# Patient Record
Sex: FEMALE | HISPANIC OR LATINO | URBAN - METROPOLITAN AREA
[De-identification: names, ages, dates, MRNs, and addresses within clinical notes are randomized per-mention and may not be internally consistent; named-entity substitution may affect disease eponyms.]

---

## 2018-10-03 ENCOUNTER — OFFICE VISIT (OUTPATIENT)
Dept: URBAN - METROPOLITAN AREA CLINIC 48 | Facility: CLINIC | Age: 83
End: 2018-10-03
Payer: MEDICARE

## 2018-10-03 DIAGNOSIS — E11.3211 TYPE 2 DIAB W MILD NONPRLF DIABETIC RTNOP W MACULAR EDEMA, RIGHT EYE: Primary | ICD-10-CM

## 2018-10-03 DIAGNOSIS — H04.123 TEAR FILM INSUFFICIENCY OF BILATERAL LACRIMAL GLANDS: ICD-10-CM

## 2018-10-03 DIAGNOSIS — H35.371 PUCKERING OF MACULA, RIGHT EYE: ICD-10-CM

## 2018-10-03 DIAGNOSIS — Z96.1 PRESENCE OF INTRAOCULAR LENS: ICD-10-CM

## 2018-10-03 PROCEDURE — 92134 CPTRZ OPH DX IMG PST SGM RTA: CPT | Performed by: OPTOMETRIST

## 2018-10-03 PROCEDURE — 92004 COMPRE OPH EXAM NEW PT 1/>: CPT | Performed by: OPTOMETRIST

## 2018-10-03 PROCEDURE — 92014 COMPRE OPH EXAM EST PT 1/>: CPT | Performed by: OPTOMETRIST

## 2018-10-03 ASSESSMENT — INTRAOCULAR PRESSURE: OD: 13

## 2018-10-03 NOTE — IMPRESSION/PLAN
Impression: Type 2 diab w mild nonprlf diabetic rtnop w macular edema, right eye: R97.1414. OD. Plan: No Non-Proliferative Diabetic Retinopathy, mild Diabetic Macular Edema and no Neovascularization of the iris, disc, or elsewhere. OCT performed and reviewed today, signs of CSME and ERM OD. Discussed ocular and systemic benefits of blood sugar control. Send notes to PCP. Refer for retinal consult next available.

## 2018-10-03 NOTE — IMPRESSION/PLAN
Impression: Puckering of macula, right eye: H35.371. OD. Plan: Epiretinal membrane w/cystic changes at macula vs CSME OD. Affecting vision. Discussed condition w/pt, refer for retinal consult, next available.

## 2018-10-03 NOTE — IMPRESSION/PLAN
Impression: Presence of intraocular lens: Z96.1. OD. Plan: Condition is stable, no further treatment at this time. Monitor.

## 2018-10-03 NOTE — IMPRESSION/PLAN
Impression: Tear film insufficiency of bilateral lacrimal glands: H04.123. OD. Plan: Pt ed re: condition. Use AT's BID-QID OU, Omega-3 fatty acids, humidifier.

## 2018-10-18 ENCOUNTER — OFFICE VISIT (OUTPATIENT)
Dept: URBAN - METROPOLITAN AREA CLINIC 48 | Facility: CLINIC | Age: 83
End: 2018-10-18
Payer: MEDICARE

## 2018-10-18 PROCEDURE — 92134 CPTRZ OPH DX IMG PST SGM RTA: CPT | Performed by: OPHTHALMOLOGY

## 2018-10-18 PROCEDURE — 92014 COMPRE OPH EXAM EST PT 1/>: CPT | Performed by: OPHTHALMOLOGY

## 2018-10-18 ASSESSMENT — INTRAOCULAR PRESSURE: OD: 14

## 2018-10-18 NOTE — IMPRESSION/PLAN
Impression: Diagnosis: Puckering of macula, right eye. Code: F04.773. Condition: established, stable. Plan: OCT shows ERM w/mild thickening stable vs. 2-3 yrs ago. VA today one line better than 3 years ago. Pt denies distorted Va, pt satisfied w/current Va. Monocular- weary of surgical intervention. Will re-assess in 6 mos or PRN DFE/OCT. Call if Va worsens.

## 2019-04-18 ENCOUNTER — OFFICE VISIT (OUTPATIENT)
Dept: URBAN - METROPOLITAN AREA CLINIC 48 | Facility: CLINIC | Age: 84
End: 2019-04-18
Payer: MEDICARE

## 2019-04-18 PROCEDURE — 92134 CPTRZ OPH DX IMG PST SGM RTA: CPT | Performed by: OPHTHALMOLOGY

## 2019-04-18 PROCEDURE — 99213 OFFICE O/P EST LOW 20 MIN: CPT | Performed by: OPHTHALMOLOGY

## 2019-04-18 ASSESSMENT — INTRAOCULAR PRESSURE: OD: 19

## 2019-04-18 NOTE — IMPRESSION/PLAN
Impression: Diagnosis: Puckering of macula, right eye. Code: R77.665. Condition: established, stable. Plan: ERM w/mild thickening, stable. Pt satisfied w/current Va and prefers to observe- not motivated for sx. Monocular precautions d/w pt today. Rec Mrx update. Will re-assess in 6 mos or PRN DFE/OCT. Call if Va worsens.

## 2019-10-31 ENCOUNTER — OFFICE VISIT (OUTPATIENT)
Dept: URBAN - METROPOLITAN AREA CLINIC 48 | Facility: CLINIC | Age: 84
End: 2019-10-31
Payer: MEDICARE

## 2019-10-31 PROCEDURE — 99214 OFFICE O/P EST MOD 30 MIN: CPT | Performed by: OPHTHALMOLOGY

## 2019-10-31 PROCEDURE — 92134 CPTRZ OPH DX IMG PST SGM RTA: CPT | Performed by: OPHTHALMOLOGY

## 2019-10-31 ASSESSMENT — INTRAOCULAR PRESSURE: OD: 12

## 2019-10-31 NOTE — IMPRESSION/PLAN
Impression: Diagnosis: Puckering of macula, right eye. Code: P45.358. Plan: Stable. Pt still not motivated for surgery, satisfied w/current Va. Monocular precautions d/w pt today. Will cont to monitor. F/u 6-7mths dfe/oct or sooner.

## 2024-03-13 ENCOUNTER — OFFICE VISIT (OUTPATIENT)
Dept: URBAN - METROPOLITAN AREA CLINIC 48 | Facility: CLINIC | Age: 89
End: 2024-03-13
Payer: MEDICARE

## 2024-03-13 DIAGNOSIS — H35.371 PUCKERING OF MACULA, RIGHT EYE: ICD-10-CM

## 2024-03-13 DIAGNOSIS — E11.9 TYPE 2 DIABETES MELLITUS W/O COMPLICATION: Primary | ICD-10-CM

## 2024-03-13 PROCEDURE — 99204 OFFICE O/P NEW MOD 45 MIN: CPT | Performed by: OPTOMETRIST

## 2024-03-13 PROCEDURE — 92134 CPTRZ OPH DX IMG PST SGM RTA: CPT | Performed by: OPTOMETRIST

## 2024-03-13 RX ORDER — SITAGLIPTIN 25 MG/1
25 MG TABLET, FILM COATED ORAL
Qty: 0 | Refills: 0 | Status: ACTIVE
Start: 2024-03-13

## 2024-03-13 ASSESSMENT — INTRAOCULAR PRESSURE: OD: 10
